# Patient Record
Sex: MALE | Race: WHITE | NOT HISPANIC OR LATINO | Employment: FULL TIME | URBAN - METROPOLITAN AREA
[De-identification: names, ages, dates, MRNs, and addresses within clinical notes are randomized per-mention and may not be internally consistent; named-entity substitution may affect disease eponyms.]

---

## 2017-03-20 ENCOUNTER — OFFICE VISIT (OUTPATIENT)
Dept: URGENT CARE | Facility: PHYSICIAN GROUP | Age: 32
End: 2017-03-20
Payer: OTHER MISCELLANEOUS

## 2017-03-20 VITALS
BODY MASS INDEX: 25.77 KG/M2 | HEART RATE: 74 BPM | SYSTOLIC BLOOD PRESSURE: 142 MMHG | HEIGHT: 70 IN | DIASTOLIC BLOOD PRESSURE: 80 MMHG | TEMPERATURE: 97.4 F | OXYGEN SATURATION: 97 % | WEIGHT: 180 LBS

## 2017-03-20 DIAGNOSIS — J01.00 ACUTE MAXILLARY SINUSITIS, RECURRENCE NOT SPECIFIED: ICD-10-CM

## 2017-03-20 PROCEDURE — 99204 OFFICE O/P NEW MOD 45 MIN: CPT | Performed by: FAMILY MEDICINE

## 2017-03-20 RX ORDER — AMOXICILLIN AND CLAVULANATE POTASSIUM 875; 125 MG/1; MG/1
1 TABLET, FILM COATED ORAL 2 TIMES DAILY
Qty: 14 TAB | Refills: 0 | Status: SHIPPED | OUTPATIENT
Start: 2017-03-20 | End: 2017-03-27

## 2017-03-20 RX ORDER — CODEINE PHOSPHATE AND GUAIFENESIN 10; 100 MG/5ML; MG/5ML
5 SOLUTION ORAL EVERY 4 HOURS PRN
Qty: 90 ML | Refills: 0 | Status: SHIPPED | OUTPATIENT
Start: 2017-03-20

## 2017-03-20 NOTE — MR AVS SNAPSHOT
"        Sergey Frazier   3/20/2017 9:40 AM   Office Visit   MRN: [id number]    Department:  [facility]   Dept Phone:  [phone]    Description:  Male : 1985   Provider:  James Cheatham M.D.           Reason for Visit     Cough cough, chest congestion, head pressure and sinus pressure x1.5weeks      Allergies as of 3/20/2017     No Known Allergies      You were diagnosed with     Acute maxillary sinusitis, recurrence not specified   [[id number]]         Vital Signs     Blood Pressure Pulse Temperature Height Weight Body Mass Index    142/80 mmHg 74 36.3 °C (97.4 °F) 1.778 m (5' 10\") 81.647 kg (180 lb) 25.83 kg/m2    Oxygen Saturation Smoking Status                97% Light Tobacco Smoker          Basic Information     Date Of Birth Sex Race Ethnicity Preferred Language    1985 Male White Non- English      Health Maintenance     Patient has no pending health maintenance at this time      Current Immunizations     No immunizations on file.      Below and/or attached are the medications your provider expects you to take. Review all of your home medications and newly ordered medications with your provider and/or pharmacist. Follow medication instructions as directed by your provider and/or pharmacist. Please keep your medication list with you and share with your provider. Update the information when medications are discontinued, doses are changed, or new medications (including over-the-counter products) are added; and carry medication information at all times in the event of emergency situations     Allergies:  No Known Allergies          Medications  Valid as of: 2017 -  5:33 PM    Generic Name Brand Name Tablet Size Instructions for use    Amoxicillin-Pot Clavulanate (Tab) AUGMENTIN 875-125 MG Take 1 Tab by mouth 2 times a day for 7 days.        Guaifenesin-Codeine (Solution) ROBITUSSIN -10 mg/5mL Take 5 mL by mouth every four hours as needed for Cough.        .          "       Medicines prescribed today were sent to:     [id number] - [place], [place] - [address]    [address] [place] [address]    Phone: [phone] Fax: [phone]    Open 24 Hours?: No      Medication refill instructions:       If your prescription bottle indicates you have medication refills left, it is not necessary to call your provider’s office. Please contact your pharmacy and they will refill your medication.    If your prescription bottle indicates you do not have any refills left, you may request refills at any time through one of the following ways: The online [employer] system (except Urgent Care), by calling your provider’s office, or by asking your pharmacy to contact your provider’s office with a refill request. Medication refills are processed only during regular business hours and may not be available until the next business day. Your provider may request additional information or to have a follow-up visit with you prior to refilling your medication.   *Please Note: Medication refills are assigned a new Rx number when refilled electronically. Your pharmacy may indicate that no refills were authorized even though a new prescription for the same medication is available at the pharmacy. Please request the medicine by name with the pharmacy before contacting your provider for a refill.           [employer] Access Code: [email or web address]  Expires: 4/19/2017  9:36 AM    [employer]  A secure, online tool to manage your health information     [employer]® is a secure, online tool that connects you to your personalized health information from the privacy of your home -- day or night - making it very easy for you to manage your healthcare. Once the activation process is completed, you can even access your medical information using the [employer] drew, which is available for free in the Apple Drew store or Google Play store.     [employer] provides the following levels of access (as shown below):   My  Chart Features   [facility] Doctor [facility]  Specialists [facility]  Urgent  Care [facility]  Primary Care  Doctor   Email your healthcare team securely and privately 24/7 X X X    Manage appointments: schedule your next appointment; view details of past/upcoming appointments X      Request prescription refills. X      View recent personal medical records, including lab and immunizations X X X X   View health record, including health history, allergies, medications X X X X   Read reports about your outpatient visits, procedures, consult and ER notes X X X X   See your discharge summary, which is a recap of your hospital and/or ER visit that includes your diagnosis, lab results, and care plan. X X       How to register for [employer]:  1. Go to  [email or web address].  2. Click on the Sign Up Now box, which takes you to the New Member Sign Up page. You will need to provide the following information:  a. Enter your [employer] Access Code exactly as it appears at the top of this page. (You will not need to use this code after you’ve completed the sign-up process. If you do not sign up before the expiration date, you must request a new code.)   b. Enter your date of birth.   c. Enter your home email address.   d. Click Submit, and follow the next screen’s instructions.  3. Create a [employer] ID. This will be your [employer] login ID and cannot be changed, so think of one that is secure and easy to remember.  4. Create a [employer] password. You can change your password at any time.  5. Enter your Password Reset Question and Answer. This can be used at a later time if you forget your password.   6. Enter your e-mail address. This allows you to receive e-mail notifications when new information is available in [employer].  7. Click Sign Up. You can now view your health information.    For assistance activating your [employer] account, call [phone]        Quit Tobacco Information     Do you want to quit using  tobacco?    Quitting tobacco decreases risks of cancer, heart and lung disease, increases life expectancy, improves sense of taste and smell, and increases spending money, among other benefits.    If you are thinking about quitting, we can help.  • [facility] Quit Tobacco Program: 901.330.9333  o Program occurs weekly for four weeks and includes pharmacist consultation on products to support quitting smoking or chewing tobacco. A provider referral is needed for pharmacist consultation.  • Tobacco Users Help Hotline: 5-292-QUIT-NOW (871-2812) or https://[place].quitlogix.org/  o Free, confidential telephone and online coaching for [place] residents. Sessions are designed on a schedule that is convenient for you. Eligible clients receive free nicotine replacement therapy.  • Nationally: www.smokefree.gov  o Information and professional assistance to support both immediate and long-term needs as you become, and remain, a non-smoker. Smokefree.gov allows you to choose the help that best fits your needs.

## 2017-03-20 NOTE — PROGRESS NOTES
"Chief Complaint   Patient presents with   • Cough     cough, chest congestion, head pressure and sinus pressure x1.5weeks         Sinusitis  This is a new problem. The current episode started in the past 10 days. The problem has been gradually worsening since onset. There has been no fever. Associated symptoms include congestion, coughing, headaches and sinus pressure. Pertinent negatives include no sneezing or sore throat. Past treatments include nothing.     Social History   Substance Use Topics   • Smoking status: Light Tobacco Smoker   • Smokeless tobacco: None   • Alcohol Use: None         No current outpatient prescriptions on file prior to visit.     No current facility-administered medications on file prior to visit.           No Known Allergies      Family history was reviewed and not pertinent     Review of Systems   Constitutional: Negative for fever.   HENT: Positive for congestion and sinus pressure. Negative for sneezing and sore throat.    Respiratory: Positive for cough.    Neurological: Positive for headaches.   All other systems reviewed and are negative.         Objective:     Blood pressure 142/80, pulse 74, temperature 36.3 °C (97.4 °F), height 1.778 m (5' 10\"), weight 81.647 kg (180 lb), SpO2 97 %.      Physical Exam   Constitutional: patient is oriented to person, place, and time. Patient appears well-developed and well-nourished.   HENT:   Head: Normocephalic and atraumatic.   Right Ear: Hearing, tympanic membrane and external ear normal.   Left Ear: Hearing, tympanic membrane and external ear normal.   Nose: Mucosal edema and rhinorrhea present. No sinus tenderness. No epistaxis.  Left and right sinus tenderness noted  Mouth/Throat: Uvula is midline and mucous membranes are normal. Oropharyngeal exudate present. No posterior oropharyngeal edema or posterior oropharyngeal erythema.   Eyes: Conjunctivae and EOM are normal. Pupils are equal, round, and reactive to light. Left and right eyes - " no discharge. No scleral icterus.   Neck: Normal range of motion. Neck supple. No JVD present. No tracheal deviation present. No thyromegaly present.   Cardiovascular: Normal rate, regular rhythm, normal heart sounds and intact distal pulses.    No murmur heard.  Pulmonary/Chest: Breath sounds normal. No respiratory distress.   no wheezes, rales.      Musculoskeletal: Normal range of motion.   no edema.   Lymphadenopathy:     There is positive  cervical adenopathy.   Neurological:   alert and oriented to person, place, and time.   Skin: Skin is warm and dry. No erythema.   Psychiatric:   normal mood and affect.  behavior is normal.   Nursing note and vitals reviewed.          Assessment/Plan:     1. Acute maxillary sinusitis, recurrence not specified     - amoxicillin-clavulanate (AUGMENTIN) 875-125 MG Tab; Take 1 Tab by mouth 2 times a day for 7 days.  Dispense: 14 Tab; Refill: 0  - guaifenesin-codeine (CHERATUSSIN AC) Solution oral solution; Take 5 mL by mouth every four hours as needed for Cough.  Dispense: 90 mL; Refill: 0